# Patient Record
Sex: FEMALE
[De-identification: names, ages, dates, MRNs, and addresses within clinical notes are randomized per-mention and may not be internally consistent; named-entity substitution may affect disease eponyms.]

---

## 2020-10-23 ENCOUNTER — HOSPITAL ENCOUNTER (OUTPATIENT)
Dept: HOSPITAL 92 - SCSCT | Age: 80
Discharge: HOME | End: 2020-10-23
Attending: UROLOGY
Payer: MEDICARE

## 2020-10-23 DIAGNOSIS — K59.00: ICD-10-CM

## 2020-10-23 DIAGNOSIS — R31.0: Primary | ICD-10-CM

## 2020-10-23 PROCEDURE — 74178 CT ABD&PLV WO CNTR FLWD CNTR: CPT

## 2020-10-23 PROCEDURE — 82565 ASSAY OF CREATININE: CPT

## 2020-10-23 NOTE — CT
CT ABDOMEN AND PELVIS WITH AND WITHOUT IV CONTRAST

 10/23/2020



CLINICAL INFORMATION:

Gross hematuria



COMPARISON:

 2/10/2017



Technique:

Multiple contiguous axial CT images are obtained through the abdomen and pelvis with IV contrast. Cor
onal reformatted images are provided.



FINDINGS:



Lower Chest: Lung bases are clear.

Vessels: Minimal vascular calcifications in the proximal abdominal aorta. 



Abdomen:

Portal vein:Patent

Gallbladder: Within normal limits for CT imaging.

Liver: within normal limits.

Spleen: within normal limits.

Pancreas: within normal limits.

Adrenals: within normal limits.

Kidneys and ureters: Subcentimeter too small to characterize hypodense lesions are again seen in each
 kidney which are stable compared to the prior study and most suggestive of bilateral renal cysts

given stability over this period of time. No enhancing renal mass is identified. No renal or ureteral
 calculi are seen bilaterally. No hydronephrosis is present. Nonspecific tortuosity of the proximal

right ureter is seen.



Bowel: Small to moderate amount retained fecal material seen throughout the colon. Loops of small bow
el are normal in caliber.

Appendix: The appendix is visualized and normal in caliber.





Peritoneum: No ascites or free air; no fluid collection.

Mesentery and Retroperitoneum: No enlarged mesenteric or retroperitoneal lymph nodes.

Abdominal Wall: within normal limits.



Pelvis:

Reproductive Organs: No pelvic masses.

Bladder: within normal limits.



Bones: No suspicious lytic or sclerotic osseous lesions.  



IMPRESSION:



1. No acute findings in the abdomen or pelvis.

2. No renal or ureteral calculi are seen bilaterally, and no enhancing renal mass is identified.

3. Constipation.



Reported By: Nathan Ledezma 

Electronically Signed:  10/23/2020 12:45 PM

## 2020-10-24 LAB — ESTIMATED GFR-MDRD - POC: 60
